# Patient Record
Sex: MALE | Race: BLACK OR AFRICAN AMERICAN | URBAN - METROPOLITAN AREA
[De-identification: names, ages, dates, MRNs, and addresses within clinical notes are randomized per-mention and may not be internally consistent; named-entity substitution may affect disease eponyms.]

---

## 2017-11-30 ENCOUNTER — OFFICE VISIT (OUTPATIENT)
Dept: SURGERY | Age: 62
End: 2017-11-30

## 2017-11-30 VITALS
HEIGHT: 69 IN | BODY MASS INDEX: 25.33 KG/M2 | SYSTOLIC BLOOD PRESSURE: 129 MMHG | OXYGEN SATURATION: 96 % | WEIGHT: 171 LBS | DIASTOLIC BLOOD PRESSURE: 85 MMHG | HEART RATE: 93 BPM

## 2017-11-30 DIAGNOSIS — K61.0 PERIANAL ABSCESS: Primary | ICD-10-CM

## 2017-11-30 RX ORDER — LIDOCAINE HYDROCHLORIDE AND EPINEPHRINE 20; 10 MG/ML; UG/ML
10 INJECTION, SOLUTION INFILTRATION; PERINEURAL ONCE
Qty: 10 ML | Refills: 0
Start: 2017-11-30 | End: 2017-11-30

## 2017-11-30 NOTE — PROGRESS NOTES
The patient is seen today in referral from Dr. Ephraim Camp regarding painful swelling in the right buttock. Dr. Ephraim Camp had drained some infected fluid out yesterday. The patient reports he continues to have pain in the area. The patient has started oral antibiotics. On examination the patient has a tender fluctuant mass in the right perianal region located about 2-1/2 cm from the anal verge. Incision and drainage was recommended. Risks vs benefits were reviewed. The site was marked and sterilely prepared and draped. After standard time-out was performed, local anesthesia 1% Xylocaine with epinephrine was injected, a total of about 5 cc. Incision was performed approximately 1-1/2 cm in length exposing a cavity about 2 cm deep and 1-1/2 cm wide with blood and some purulent material. The cavity was palpated with a clamp without evidence of deeper extension. The cavity was packed open with 1/4 inch Iodoform and covered by a dry gauze dressing. I recommended the patient start 2x per day Sitz baths for 3 days. He should remove the packing in 2 days and continue with dressing changes after that. The patient will see me in follow up in one week. Final Diagnosis:  Incision and drainage of perirectal abscess.     MedDATA/adilia     cc: Kloton Penn MD

## 2017-11-30 NOTE — MR AVS SNAPSHOT
Visit Information Date & Time Provider Department Dept. Phone Encounter #  
 11/30/2017  8:20 AM Hiwot Lopez MD [facility] [address] [phone] [id number] Upcoming Health Maintenance Date Due DTaP/Tdap/Td series (1 - Tdap) 8/14/1976 FOBT Q 1 YEAR AGE 50-75 8/14/2005 ZOSTER VACCINE AGE 60> 6/14/2015 Influenza Age 5 to Adult 8/1/2017 Allergies as of 11/30/2017  Review Complete On: 11/30/2017 By: Anna Jessica LPN No Known Allergies Current Immunizations  Never Reviewed No immunizations on file. Not reviewed this visit Vitals BP Pulse Height(growth percentile) Weight(growth percentile) SpO2 BMI  
 129/85 (BP 1 Location: Right arm, BP Patient Position: Sitting) 93 5' 9\" (1.753 m) 171 lb (77.6 kg) 96% 25.25 kg/m2 Smoking Status Current Every Day Smoker BMI and BSA Data Body Mass Index Body Surface Area  
 25.25 kg/m 2 1.94 m 2 Your Updated Medication List  
  
   
This list is accurate as of: 11/30/17  8:58 AM.  Always use your most recent med list.  
  
  
  
  
 AMOXICILLIN PO Take  by mouth. Introducing [facility] & HEALTH SERVICES! [facility] introduces [employer] patient portal. Now you can access parts of your medical record, email your doctor's office, and request medication refills online. 1. In your internet browser, go to [email or web address] 2. Click on the First Time User? Click Here link in the Sign In box. You will see the New Member Sign Up page. 3. Enter your [employer]School Access Code exactly as it appears below. You will not need to use this code after youve completed the sign-up process. If you do not sign up before the expiration date, you must request a new code. · [employer] Access Code: [email or web address] Expires: 2/28/2018  8:12 AM 
 
4.  Enter the last four digits of your Social Security Number (xxxx) and Date of Birth (mm/dd/yyyy) as indicated and click Submit. You will be taken to the next sign-up page. 5. Create a Reachpod [employer] Bilisim ID. This will be your Re[employer] login ID and cannot be changed, so think of one that is secure and easy to remember. 6. Create a Re[employer] password. You can change your password at any time. 7. Enter your Password Reset Question and Answer. This can be used at a later time if you forget your password. 8. Enter your e-mail address. You will receive e-mail notification when new information is available in [address] 9. Click Sign Up. You can now view and download portions of your medical record. 10. Click the Download Summary menu link to download a portable copy of your medical information. If you have questions, please visit the Frequently Asked Questions section of the [employer] website. Remember, Re[employer] is NOT to be used for urgent needs. For medical emergencies, dial 911. Now available from your iPhone and Android! Please provide this summary of care documentation to your next provider. Your primary care clinician is listed as Mallory Dorado. If you have any questions after today's visit, please call [phone].

## 2017-11-30 NOTE — PROGRESS NOTES
[facility]  OFFICE PROCEDURE PROGRESS NOTE        Chart reviewed for the following:   Cristian KIM LPN, have reviewed the History, Physical and updated the Allergic reactions for [address] performed immediately prior to start of procedure:   Delmer Ott LPN, have performed the following reviews on Hulan Bills prior to the start of the procedure:            * Patient was identified by name and date of birth   * Agreement on procedure being performed was verified  * Risks and Benefits explained to the patient  * Procedure site verified and marked as necessary  * Patient was positioned for comfort  * Consent was signed and verified     Time: 0840      Date of procedure: 11/30/2017    Procedure performed by:  Marky Vargas MD    Provider assisted by: Cristian Schmitt LPN    Patient assisted by: self    How tolerated by patient: tolerated the procedure well with no complications    Post Procedural Pain Scale: 0 - No Hurt    Comments: none

## 2017-12-07 ENCOUNTER — OFFICE VISIT (OUTPATIENT)
Dept: SURGERY | Age: 62
End: 2017-12-07

## 2017-12-07 VITALS
WEIGHT: 181 LBS | TEMPERATURE: 97.9 F | OXYGEN SATURATION: 99 % | HEIGHT: 69 IN | HEART RATE: 63 BPM | DIASTOLIC BLOOD PRESSURE: 85 MMHG | SYSTOLIC BLOOD PRESSURE: 148 MMHG | BODY MASS INDEX: 26.81 KG/M2

## 2017-12-07 DIAGNOSIS — Z09 POSTOPERATIVE EXAMINATION: Primary | ICD-10-CM

## 2017-12-07 NOTE — PROGRESS NOTES
The patient is status post incision and drainage of perianal abscess. He reports no pain at the site. He is continuing antibiotics. On examination the site of the I&D has minimal induration. There is no erythema or tenderness. There is no discharge. The site of abscess appears to be healing well. He can follow up prn. Final Diagnosis:  Status post I&D perianal abscess, post-operative visit.     Gerson/adilia

## 2017-12-07 NOTE — MR AVS SNAPSHOT
Visit Information Date & Time Provider Department Dept. Phone Encounter #  
 12/7/2017  4:00 PM Андрей Thorne MD [facility] [address] [phone] [id number] Upcoming Health Maintenance Date Due Hepatitis C Screening 1955 Pneumococcal 19-64 Medium Risk (1 of 1 - PPSV23) 8/14/1974 DTaP/Tdap/Td series (1 - Tdap) 8/14/1976 FOBT Q 1 YEAR AGE 50-75 8/14/2005 ZOSTER VACCINE AGE 60> 6/14/2015 Influenza Age 5 to Adult 8/1/2017 Allergies as of 12/7/2017  Review Complete On: 12/7/2017 By: Андрей Thorne MD  
 No Known Allergies Current Immunizations  Never Reviewed No immunizations on file. Not reviewed this visit You Were Diagnosed With   
  
 Codes Comments Postoperative examination    -  Primary ICD-10-CM: H20 ICD-9-CM: V67.00 Vitals BP Pulse Temp Height(growth percentile) Weight(growth percentile) SpO2  
 148/85 (BP 1 Location: Right arm, BP Patient Position: Sitting) 63 97.9 °F (36.6 °C) 5' 9\" (1.753 m) 181 lb (82.1 kg) 99% BMI Smoking Status 26.73 kg/m2 Current Every Day Smoker Vitals History BMI and BSA Data Body Mass Index Body Surface Area  
 26.73 kg/m 2 2 m 2 Your Updated Medication List  
  
   
This list is accurate as of: 12/7/17  5:13 PM.  Always use your most recent med list.  
  
  
  
  
 AMOXICILLIN PO Take  by mouth. AUGMENTIN PO Take  by mouth. Introducing [facility] & HEALTH SERVICES! Sean Ball introduces [employer] patient portal. Now you can access parts of your medical record, email your doctor's office, and request medication refills online. 1. In your internet browser, go to [email or web address] 2. Click on the First Time User? Click Here link in the Sign In box. You will see the New Member Sign Up page. 3. Enter your [employer] Access Code exactly as it appears below.  You will not need to use this code after youve completed the sign-up process. If you do not sign up before the expiration date, you must request a new code. · SkySQL Access Code: [email or web address] Expires: 2/28/2018  8:12 AM 
 
4. Enter the last four digits of your Social Security Number (xxxx) and Date of Birth (mm/dd/yyyy) as indicated and click Submit. You will be taken to the next sign-up page. 5. Create a SkySQL ID. This will be your SkySQL login ID and cannot be changed, so think of one that is secure and easy to remember. 6. Create a SkySQL password. You can change your password at any time. 7. Enter your Password Reset Question and Answer. This can be used at a later time if you forget your password. 8. Enter your e-mail address. You will receive e-mail notification when new information is available in [address] 9. Click Sign Up. You can now view and download portions of your medical record. 10. Click the Download Summary menu link to download a portable copy of your medical information. If you have questions, please visit the Frequently Asked Questions section of the SkySQL website. Remember, SkySQL is NOT to be used for urgent needs. For medical emergencies, dial 911. Now available from your iPhone and Android! Please provide this summary of care documentation to your next provider. Your primary care clinician is listed as Franky Phalen. If you have any questions after today's visit, please call [phone].

## 2022-03-19 PROBLEM — K61.0 PERIANAL ABSCESS: Status: ACTIVE | Noted: 2017-11-30

## 2023-07-31 ENCOUNTER — HOSPITAL ENCOUNTER (OUTPATIENT)
Facility: HOSPITAL | Age: 68
Discharge: HOME | End: 2023-08-03
Attending: INTERNAL MEDICINE
Payer: MEDICARE

## 2023-07-31 DIAGNOSIS — R63.4 ABNORMAL WEIGHT LOSS: ICD-10-CM

## 2023-07-31 DIAGNOSIS — D50.9 IRON DEFICIENCY ANEMIA, UNSPECIFIED IRON DEFICIENCY ANEMIA TYPE: ICD-10-CM

## 2023-07-31 LAB — CREAT BLD-MCNC: 5.7 MG/DL (ref 0.6–1.3)

## 2023-07-31 PROCEDURE — 82565 ASSAY OF CREATININE: CPT

## 2023-07-31 PROCEDURE — 74176 CT ABD & PELVIS W/O CONTRAST: CPT

## 2023-07-31 PROCEDURE — 71260 CT THORAX DX C+: CPT

## 2023-08-09 ENCOUNTER — ANESTHESIA EVENT (OUTPATIENT)
Facility: HOSPITAL | Age: 68
End: 2023-08-09
Payer: MEDICARE

## 2023-08-09 ENCOUNTER — ANESTHESIA (OUTPATIENT)
Facility: HOSPITAL | Age: 68
End: 2023-08-09
Payer: MEDICARE

## 2023-08-09 ENCOUNTER — HOSPITAL ENCOUNTER (OUTPATIENT)
Facility: HOSPITAL | Age: 68
Setting detail: OUTPATIENT SURGERY
Discharge: HOME | End: 2023-08-09
Attending: SPECIALIST | Admitting: SPECIALIST
Payer: MEDICARE

## 2023-08-09 VITALS
OXYGEN SATURATION: 100 % | TEMPERATURE: 97.7 F | HEIGHT: 68 IN | HEART RATE: 66 BPM | DIASTOLIC BLOOD PRESSURE: 77 MMHG | RESPIRATION RATE: 15 BRPM | WEIGHT: 126.3 LBS | SYSTOLIC BLOOD PRESSURE: 106 MMHG | BODY MASS INDEX: 19.14 KG/M2

## 2023-08-09 PROCEDURE — 3700000001 HC ADD 15 MINUTES (ANESTHESIA): Performed by: SPECIALIST

## 2023-08-09 PROCEDURE — 2580000003 HC RX 258: Performed by: SPECIALIST

## 2023-08-09 PROCEDURE — 6370000000 HC RX 637 (ALT 250 FOR IP): Performed by: SPECIALIST

## 2023-08-09 PROCEDURE — 88313 SPECIAL STAINS GROUP 2: CPT

## 2023-08-09 PROCEDURE — 2709999900 HC NON-CHARGEABLE SUPPLY: Performed by: SPECIALIST

## 2023-08-09 PROCEDURE — 6360000002 HC RX W HCPCS: Performed by: NURSE ANESTHETIST, CERTIFIED REGISTERED

## 2023-08-09 PROCEDURE — 3700000000 HC ANESTHESIA ATTENDED CARE: Performed by: SPECIALIST

## 2023-08-09 PROCEDURE — 2500000003 HC RX 250 WO HCPCS: Performed by: NURSE ANESTHETIST, CERTIFIED REGISTERED

## 2023-08-09 PROCEDURE — 88342 IMHCHEM/IMCYTCHM 1ST ANTB: CPT

## 2023-08-09 PROCEDURE — 3600007502: Performed by: SPECIALIST

## 2023-08-09 PROCEDURE — 7100000011 HC PHASE II RECOVERY - ADDTL 15 MIN: Performed by: SPECIALIST

## 2023-08-09 PROCEDURE — 3600007512: Performed by: SPECIALIST

## 2023-08-09 PROCEDURE — 88305 TISSUE EXAM BY PATHOLOGIST: CPT

## 2023-08-09 PROCEDURE — 7100000010 HC PHASE II RECOVERY - FIRST 15 MIN: Performed by: SPECIALIST

## 2023-08-09 RX ORDER — SODIUM CHLORIDE 0.9 % (FLUSH) 0.9 %
5-40 SYRINGE (ML) INJECTION EVERY 12 HOURS SCHEDULED
Status: DISCONTINUED | OUTPATIENT
Start: 2023-08-09 | End: 2023-08-09 | Stop reason: HOSPADM

## 2023-08-09 RX ORDER — SODIUM CHLORIDE 0.9 % (FLUSH) 0.9 %
5-40 SYRINGE (ML) INJECTION PRN
Status: DISCONTINUED | OUTPATIENT
Start: 2023-08-09 | End: 2023-08-09 | Stop reason: HOSPADM

## 2023-08-09 RX ORDER — FERROUS SULFATE 325(65) MG
325 TABLET ORAL 3 TIMES DAILY
COMMUNITY

## 2023-08-09 RX ORDER — SIMETHICONE 20 MG/.3ML
EMULSION ORAL PRN
Status: DISCONTINUED | OUTPATIENT
Start: 2023-08-09 | End: 2023-08-09 | Stop reason: ALTCHOICE

## 2023-08-09 RX ORDER — SODIUM CHLORIDE 9 MG/ML
INJECTION, SOLUTION INTRAVENOUS CONTINUOUS
Status: DISCONTINUED | OUTPATIENT
Start: 2023-08-09 | End: 2023-08-09 | Stop reason: HOSPADM

## 2023-08-09 RX ORDER — SODIUM CHLORIDE 9 MG/ML
25 INJECTION, SOLUTION INTRAVENOUS PRN
Status: DISCONTINUED | OUTPATIENT
Start: 2023-08-09 | End: 2023-08-09 | Stop reason: HOSPADM

## 2023-08-09 RX ADMIN — PROPOFOL 30 MG: 10 INJECTION, EMULSION INTRAVENOUS at 12:00

## 2023-08-09 RX ADMIN — PROPOFOL 50 MG: 10 INJECTION, EMULSION INTRAVENOUS at 12:08

## 2023-08-09 RX ADMIN — PROPOFOL 30 MG: 10 INJECTION, EMULSION INTRAVENOUS at 11:48

## 2023-08-09 RX ADMIN — PROPOFOL 40 MG: 10 INJECTION, EMULSION INTRAVENOUS at 12:11

## 2023-08-09 RX ADMIN — PROPOFOL 40 MG: 10 INJECTION, EMULSION INTRAVENOUS at 11:54

## 2023-08-09 RX ADMIN — SODIUM CHLORIDE: 9 INJECTION, SOLUTION INTRAVENOUS at 11:34

## 2023-08-09 RX ADMIN — PROPOFOL 30 MG: 10 INJECTION, EMULSION INTRAVENOUS at 12:17

## 2023-08-09 RX ADMIN — LIDOCAINE HYDROCHLORIDE 100 MG: 20 INJECTION, SOLUTION EPIDURAL; INFILTRATION; INTRACAUDAL; PERINEURAL at 11:47

## 2023-08-09 RX ADMIN — PROPOFOL 30 MG: 10 INJECTION, EMULSION INTRAVENOUS at 11:51

## 2023-08-09 RX ADMIN — PROPOFOL 30 MG: 10 INJECTION, EMULSION INTRAVENOUS at 12:14

## 2023-08-09 RX ADMIN — PROPOFOL 40 MG: 10 INJECTION, EMULSION INTRAVENOUS at 11:49

## 2023-08-09 RX ADMIN — PROPOFOL 70 MG: 10 INJECTION, EMULSION INTRAVENOUS at 11:47

## 2023-08-09 RX ADMIN — PROPOFOL 30 MG: 10 INJECTION, EMULSION INTRAVENOUS at 11:52

## 2023-08-09 RX ADMIN — PROPOFOL 40 MG: 10 INJECTION, EMULSION INTRAVENOUS at 12:05

## 2023-08-09 RX ADMIN — PROPOFOL 40 MG: 10 INJECTION, EMULSION INTRAVENOUS at 11:56

## 2023-08-09 ASSESSMENT — PAIN - FUNCTIONAL ASSESSMENT: PAIN_FUNCTIONAL_ASSESSMENT: NONE - DENIES PAIN

## 2023-08-09 NOTE — ANESTHESIA POSTPROCEDURE EVALUATION
Department of Anesthesiology  Postprocedure Note    Patient: Brittney Wright  MRN: [id number]  YOB: 1955  Date of evaluation: 8/9/2023      Procedure Summary     Date: 08/09/23 Room / Location: [facility] ENDO 02 / [facility] ENDOSCOPY    Anesthesia Start: 1139 Anesthesia Stop: 3376    Procedures:       EGD DIAGNOSTIC ONLY (Upper GI Region)      COLONOSCOPY DIAGNOSTIC (Lower GI Region)      EGD BIOPSY (Upper GI Region)      COLONOSCOPY POLYPECTOMY SNARE/COLD BIOPSY (Lower GI Region) Diagnosis:       Colon cancer screening      Weight loss      Iron deficiency anemia, unspecified iron deficiency anemia type      (Colon cancer screening [Z12.11])      (Weight loss [R63.4])      (Iron deficiency anemia, unspecified iron deficiency anemia type [D50.9])    Surgeons: Yi Rincon MD Responsible Provider: Swetha Bland MD    Anesthesia Type: MAC ASA Status: 2          Anesthesia Type: MAC    Jamarcus Phase I:      Jamarcus Phase II: Jamarcus Score: 10      Anesthesia Post Evaluation    Patient location during evaluation: PACU  Patient participation: complete - patient participated  Level of consciousness: awake  Airway patency: patent  Nausea & Vomiting: no nausea  Complications: no  Cardiovascular status: blood pressure returned to baseline and hemodynamically stable  Respiratory status: acceptable  Hydration status: stable

## 2023-08-09 NOTE — H&P
Colonoscopy History and Physical      The patient was seen and examined. Date of last colonoscopy: none, Polyps  No      The airway was assessed and documented. The problem list, past medical history, and medications were reviewed. Patient Active Problem List   Diagnosis    Perianal abscess     Social History     Socioeconomic History    Marital status:      Spouse name: Not on file    Number of children: Not on file    Years of education: Not on file    Highest education level: Not on file   Occupational History    Not on file   Tobacco Use    Smoking status: Not on file    Smokeless tobacco: Not on file   Substance and Sexual Activity    Alcohol use: Not on file    Drug use: Not on file    Sexual activity: Not on file   Other Topics Concern    Not on file   Social History Narrative    Not on file     Social Determinants of Health     Financial Resource Strain: Not on file   Food Insecurity: Not on file   Transportation Needs: Not on file   Physical Activity: Not on file   Stress: Not on file   Social Connections: Not on file   Intimate Partner Violence: Not on file   Housing Stability: Not on file     History reviewed. No pertinent past medical history. Prior to Admission Medications   Prescriptions Last Dose Informant Patient Reported? Taking?   ferrous sulfate (IRON 325) 325 (65 Fe) MG tablet 8/8/2023  Yes Yes   Sig: Take 1 tablet by mouth in the morning, at noon, and at bedtime      Facility-Administered Medications: None       The patient was seen and examined in the endoscopy suite. The airway was assessed and documented. The problem list and medications were reviewed. Chief complaint, history of present illness, and review of systems and Past medical History are positive for: Iron def anemia, weight loss    The heart, lungs and mental status were satisfactory for the administration of sedation and for the procedure.      I discussed with the patient the objectives, risks, consequences and

## 2023-08-09 NOTE — OP NOTE
[address] [facility]  [address] [place], [place] [address], [address] [facility]                 Colonoscopy Procedure Note    Indications:   See Preoperative Diagnosis above  Referring Physician: Jai Qiu MD  Anesthesia/Sedation: MAC anesthesia Propofol  Endoscopist:  Dr. Yamila Lizarraga  Assistant:  Circulator: Junacarlos Garrett RN  Preoperative diagnosis: Colon cancer screening [Z12.11]  Weight loss [R63.4]  Iron deficiency anemia, unspecified iron deficiency anemia type [D50.9]  Postoperative diagnosis: * No post-op diagnosis entered *    Procedure in Detail:  Informed consent was obtained for the procedure, including sedation. Risks of perforation, hemorrhage, adverse drug reaction, and aspiration were discussed. The patient was placed in the left lateral decubitus position. Based on the pre-procedure assessment, including review of the patient's medical history, medications, allergies, and review of systems, he had been deemed to be an appropriate candidate for  sedation; he was therefore sedated with the medications listed above. The patient was monitored continuously with ECG tracing, pulse oximetry, blood pressure monitoring, and direct observations. A rectal examination was performed. The [id number] was inserted into the rectum and advanced under direct vision to the cecum, which was identified by the ileocecal valve and appendiceal orifice. The quality of the colonic preparation was good. A careful inspection was made as the colonoscope was withdrawn, including a retroflexed view of the rectum; findings and interventions are described below. Appropriate photodocumentation was obtained.     Findings:  Rectum: 4 mm polyp removed with cold snare  Sigmoid: normal  Descending Colon: normal  Transverse Colon: 2 mm polyp removed with biopsy forceps and 6, 12 mm sessile polyps removed with hot snare  Ascending Colon: normal  Cecum: normal    Specimens:     Colon polyps    EBL: None    Complications: None;

## 2023-08-09 NOTE — OP NOTE
[address], [address] [facility]                 NAME:  Bruno Morales   :      MRN:   [id number]     Date/Time:  2023 12:24 PM    Esophagogastroduodenoscopy (EGD) Procedure Note    :  Nathan Soriano MD    Staff: Circulator: Joann Palomares RN     Referring Provider:  Yasemin Barker MD    Anethesia/Sedation:  MAC anesthesia Propofol    Preoperative diagnosis: Colon cancer screening [Z12.11]  Weight loss [R63.4]  Iron deficiency anemia, unspecified iron deficiency anemia type [D50.9]    Postoperative diagnosis: Iron def anemia, weight loss  Procedure Details     After infom consent was obtained for the procedure, with all risks and benefits of procedure explained the patient was taken to the endoscopy suite and placed in the left lateral decubitus position. Following sequential administration of sedation as per above, the [id number] gastroscope was inserted into the mouth and advanced under direct vision to second portion of the duodenum. A careful inspection was made as the gastroscope was withdrawn, including a retroflexed view of the proximal stomach; findings and interventions are described below. Findings:  Esophagus:normal  Stomach:Erosions, erythema and edematous mucosa seen in antrum, biopsies done  Duodenum/jejunum: normal mucosa, random biopsies done      Therapies:  none    Specimens: antral, duodenal bx           EBL: None    Complications:   None; patient tolerated the procedure well. Impression:    See Postoperative diagnosis above    Recommendations:  -Acid suppression with a proton pump inhibitor. , -Await pathology. , -Avoid NSAIDs    Discharge disposition:  Home in the company of  when able to ambulate    Nathan Soriano MD

## 2023-08-09 NOTE — PERIOP NOTE

## 2024-01-01 ENCOUNTER — HOSPITAL ENCOUNTER (EMERGENCY)
Facility: HOSPITAL | Age: 69
End: 2024-05-22
Attending: EMERGENCY MEDICINE
Payer: MEDICARE

## 2024-01-01 VITALS
HEART RATE: 49 BPM | OXYGEN SATURATION: 68 % | RESPIRATION RATE: 17 BRPM | DIASTOLIC BLOOD PRESSURE: 66 MMHG | SYSTOLIC BLOOD PRESSURE: 127 MMHG

## 2024-01-01 DIAGNOSIS — I46.9 CARDIAC ARREST (HCC): Primary | ICD-10-CM

## 2024-01-01 LAB
ANION GAP BLD CALC-SCNC: ABNORMAL (ref 10–20)
CA-I BLD-MCNC: 1.56 MMOL/L (ref 1.12–1.32)
CHLORIDE BLD-SCNC: 116 MMOL/L (ref 100–108)
COMMENT:: NORMAL
CREAT UR-MCNC: 2.5 MG/DL (ref 0.6–1.3)
GLUCOSE BLD STRIP.AUTO-MCNC: 148 MG/DL (ref 74–99)
LACTATE BLD-SCNC: 15.6 MMOL/L (ref 0.4–2)
PCO2 BLDV: >110 MMHG (ref 41–51)
PH BLDV: 6.76 (ref 7.32–7.42)
PO2 BLDV: 41 MMHG (ref 25–40)
POTASSIUM BLD-SCNC: 3.6 MMOL/L (ref 3.5–5.5)
SERVICE CMNT-IMP: ABNORMAL
SODIUM BLD-SCNC: 154 MMOL/L (ref 136–145)
SPECIMEN HOLD: NORMAL
SPECIMEN SITE: ABNORMAL

## 2024-01-01 PROCEDURE — 92950 HEART/LUNG RESUSCITATION CPR: CPT

## 2024-01-01 PROCEDURE — 99291 CRITICAL CARE FIRST HOUR: CPT

## 2024-01-01 PROCEDURE — 99285 EMERGENCY DEPT VISIT HI MDM: CPT

## 2024-01-01 PROCEDURE — 36415 COLL VENOUS BLD VENIPUNCTURE: CPT

## 2024-01-01 PROCEDURE — 82947 ASSAY GLUCOSE BLOOD QUANT: CPT

## 2024-01-01 PROCEDURE — 84295 ASSAY OF SERUM SODIUM: CPT

## 2024-01-01 PROCEDURE — 82803 BLOOD GASES ANY COMBINATION: CPT

## 2024-01-01 PROCEDURE — 82330 ASSAY OF CALCIUM: CPT

## 2024-01-01 PROCEDURE — 84132 ASSAY OF SERUM POTASSIUM: CPT

## 2024-05-22 NOTE — PROGRESS NOTES
Spiritual Care Assessment/Progress Note  [facility]    Name: King Cabrera MRN: [id number]    Age: 68 y.o.     Sex: male   Language: English     Date: 2024            Total Time Calculated: 45 min              Spiritual Assessment begun in [facility] EMERGENCY DEPT  Service Provided For: Family, Friend  Referral/Consult From: Nurse  Encounter Overview/Reason: Family Care    Spiritual beliefs:      [x] Involved in a jeramie tradition/spiritual practice:      [] Supported by a jeramie community:      [] Claims no spiritual orientation:      [] Seeking spiritual identity:           [] Adheres to an individual form of spirituality:      [] Not able to assess:                Identified resources for coping and support system:   Support System: Family members       [] Prayer                  [] Devotional reading               [] Music                  [] Guided Imagery     [] Pet visits                                        [] Other: (COMMENT)     Specific area/focus of visit   Encounter:    Crisis: Type: Code Blue, Family Care  Spiritual/Emotional needs: Type: Emotional Distress, Spiritual Support  Ritual, Rites and Sacraments:    Grief, Loss, and Adjustments: Type: Death  Ethics/Mediation:    Behavioral Health:    Palliative Care:    Advance Care Planning:           Narrative: Responded to page from ER RN requesting pastoral care for family of pt in ER15 who had coded and . Upon arrival consulted with RN and met with family who was at bedside. Wife and son tearful but grieving appropriately. Provided prayer and information for  home. Accompanied family out of ER when family was ready to leave.

## 2024-05-22 NOTE — ED NOTES
0556H  Family on bedside.  Handed over patient's valuables (4 rings) and belonging to patient's wife.      0558H  As per , family still not decided about the  home and given Nursing Supervisor Contact Number.

## 2024-05-22 NOTE — ED NOTES
Dead body being prepared by [facility] and HANY Danielle for transport to [facility].

## 2024-05-22 NOTE — ED NOTES
0425: Patient arrives to room w EMS w CPR in progress.    0428: EPI given    0430: CPR provider's switched out, EPI given and a pulse check.    0431: Calcium given    0432: Bicarb administered    0433: Pulse Check; EPI given, shock, and CPR resumed.    0435: 300 of Amio given    0436: ROSC obtained    0446: Pulse check and Lucus applied    0447: Sodium Bicarb given    0448: EPI drip increased to 15 mcg    0449: Pulse check; CPR resumed    0450: EPI administered    0451: Pulse Check    0452: TOD

## 2024-05-22 NOTE — ED PROVIDER NOTES
consultations with specialist, family decision- making, bedside attention and documentation excluding time spent on any separately billed procedures. During this entire length of time I was immediately available to the patient .   Marlen Jimenez MD       FINAL IMPRESSION     1. Cardiac arrest (HCC)         DISPOSITION/PLAN     Other Disposition:   I am the Primary Clinician of Record.   Marlen Jimenez MD (electronically signed)    (Please note that parts of this dictation were completed with voice recognition software. Quite often unanticipated grammatical, syntax, homophones, and other interpretive errors are inadvertently transcribed by the computer software. Please disregards these errors. Please excuse any errors that have escaped final proofreading.)           Marlen Jimenez MD  24 0617

## (undated) DEVICE — TRAP SURG QUAD PARABOLA SLOT DSGN SFTY SCRN TRAPEASE

## (undated) DEVICE — SNARE ENDOSCP L240CM LOOP W27MM SHTH DIA2.4MM WRK CHN 2.8MM